# Patient Record
Sex: FEMALE | Race: WHITE | NOT HISPANIC OR LATINO | Employment: UNEMPLOYED | URBAN - METROPOLITAN AREA
[De-identification: names, ages, dates, MRNs, and addresses within clinical notes are randomized per-mention and may not be internally consistent; named-entity substitution may affect disease eponyms.]

---

## 2023-04-09 ENCOUNTER — HOSPITAL ENCOUNTER (EMERGENCY)
Facility: HOSPITAL | Age: 33
Discharge: HOME | End: 2023-04-09
Attending: EMERGENCY MEDICINE | Admitting: EMERGENCY MEDICINE
Payer: MEDICAID

## 2023-04-09 VITALS
HEIGHT: 60 IN | TEMPERATURE: 98.1 F | BODY MASS INDEX: 34.95 KG/M2 | HEART RATE: 86 BPM | RESPIRATION RATE: 15 BRPM | WEIGHT: 178 LBS | OXYGEN SATURATION: 99 % | SYSTOLIC BLOOD PRESSURE: 130 MMHG | DIASTOLIC BLOOD PRESSURE: 45 MMHG

## 2023-04-09 DIAGNOSIS — H66.90 ACUTE OTITIS MEDIA, UNSPECIFIED OTITIS MEDIA TYPE: Primary | ICD-10-CM

## 2023-04-09 DIAGNOSIS — H92.01 EAR PAIN, RIGHT: ICD-10-CM

## 2023-04-09 PROCEDURE — 99283 EMERGENCY DEPT VISIT LOW MDM: CPT

## 2023-04-09 RX ORDER — AMOXICILLIN AND CLAVULANATE POTASSIUM 875; 125 MG/1; MG/1
1 TABLET, FILM COATED ORAL 2 TIMES DAILY
Qty: 19 TABLET | Refills: 0 | Status: SHIPPED | OUTPATIENT
Start: 2023-04-09 | End: 2023-04-19

## 2023-04-09 RX ORDER — AMOXICILLIN AND CLAVULANATE POTASSIUM 875; 125 MG/1; MG/1
1 TABLET, FILM COATED ORAL ONCE
Status: COMPLETED | OUTPATIENT
Start: 2023-04-09 | End: 2023-04-09

## 2023-04-09 RX ORDER — IBUPROFEN 400 MG/1
800 TABLET ORAL ONCE
Status: COMPLETED | OUTPATIENT
Start: 2023-04-09 | End: 2023-04-09

## 2023-04-09 RX ADMIN — AMOXICILLIN AND CLAVULANATE POTASSIUM 1 TABLET: 875; 125 TABLET, FILM COATED ORAL at 15:53

## 2023-04-09 RX ADMIN — IBUPROFEN 800 MG: 400 TABLET ORAL at 15:53

## 2023-04-09 NOTE — DISCHARGE INSTRUCTIONS
Take antibiotic as directed and make sure you finish the entire course.  Continue taking ibuprofen as needed for pain and discomfort, which also was swelling.  You may also take Tylenol as needed.  Do not participate in water sports or swimming until antibiotics are finished.    Follow-up with your, nose, and throat specialist for further management of ear infection as needed.  Follow-up with your primary care provider as needed.    Return to the ER for any new or worsening symptoms.

## 2023-04-09 NOTE — ED PROVIDER NOTES
Subjective   History of Present Illness  PleasantPatient is a 32-year-old obese  female with a history of left-sided mastoiditis about 5 years ago presents to the emergency room with complaints of right ear pain that radiates to her right cheek that has been ongoing since last week.  Patient states that [place] from [place] allergies to ciprofloxacin.  [place] this past weekend to read records, where she states the pain worsened.  She believes that she had a fever but did not have a thermometer to check it.  She took Tylenol for pain and fever, which she states helped.  Patient began noticing scant discharge from her ear this morning but denies dizziness, hearing loss, tinnitus.  Patient states that she vapes daily but does not use drugs or alcohol.        Review of Systems   Constitutional: Positive for fever. Negative for appetite change.   HENT: Positive for ear discharge and ear pain. Negative for congestion, facial swelling, hearing loss, rhinorrhea and sore throat.    Respiratory: Negative for chest tightness and shortness of breath.    Cardiovascular: Negative for chest pain.   Gastrointestinal: Negative for abdominal pain and nausea.   Genitourinary: Negative for dysuria and urgency.   Musculoskeletal: Negative for arthralgias and myalgias.   Neurological: Negative for dizziness, weakness and headaches.   Psychiatric/Behavioral: Negative for confusion. The patient is not nervous/anxious.    All other systems reviewed and are negative.      No past medical history on file.    Allergies   Allergen Reactions   • Ciprofloxacin Rash       No past surgical history on file.    No family history on file.    Social History     Socioeconomic History   • Marital status: Single           Objective   Physical Exam  Vitals and nursing note reviewed.   Constitutional:       General: She is awake. She is not in acute distress.     Appearance: Normal appearance. She is well-developed. She is not  diaphoretic.   HENT:      Head: Normocephalic and atraumatic. No raccoon eyes or Patricio's sign.      Right Ear: Ear canal and external ear normal. Tenderness present. No mastoid tenderness. No hemotympanum. Tympanic membrane is injected and erythematous. Tympanic membrane is not perforated or bulging.      Left Ear: Tympanic membrane, ear canal and external ear normal. No tenderness.      Nose: Nose normal.      Mouth/Throat:      Mouth: Mucous membranes are moist.      Pharynx: Oropharynx is clear.   Eyes:      Extraocular Movements: Extraocular movements intact.      Pupils: Pupils are equal, round, and reactive to light.   Cardiovascular:      Rate and Rhythm: Normal rate and regular rhythm.      Pulses: Normal pulses.      Heart sounds: Normal heart sounds. No murmur heard.  Pulmonary:      Effort: Pulmonary effort is normal.      Breath sounds: Normal breath sounds.   Abdominal:      General: Bowel sounds are normal.      Palpations: Abdomen is soft.      Tenderness: There is no abdominal tenderness.   Musculoskeletal:         General: No swelling. Normal range of motion.      Cervical back: Normal range of motion and neck supple.   Skin:     General: Skin is warm and dry.      Capillary Refill: Capillary refill takes less than 2 seconds.   Neurological:      General: No focal deficit present.      Mental Status: She is alert and oriented to person, place, and time. Mental status is at baseline.      GCS: GCS eye subscore is 4. GCS verbal subscore is 5. GCS motor subscore is 6.      Cranial Nerves: Cranial nerves 2-12 are intact.      Sensory: Sensation is intact.      Motor: Motor function is intact.      Deep Tendon Reflexes: Reflexes are normal and symmetric.   Psychiatric:         Mood and Affect: Mood normal.         Behavior: Behavior normal. Behavior is cooperative.         Procedures           ED Course      /45 (BP Location: Left arm, Patient Position: Sitting)   Pulse 86   Temp 98.1 °F (36.7  "°C)   Resp 15   Ht 152.4 cm (60\")   Wt 80.7 kg (178 lb)   SpO2 99%   BMI 34.76 kg/m²   Labs Reviewed - No data to display  Medications   amoxicillin-clavulanate (AUGMENTIN) 875-125 MG per tablet 1 tablet (has no administration in time range)     No radiology results for the last day                                       Medical Decision Making  Patient is a pleasant 32-year-old obese  female with a history of left-sided mastoiditis presenting to the emergency room with complaints of right ear pain that has been ongoing for several days.  Exam reveals erythematous tympanic membrane on the right side with reported tenderness.  Appropriate cone of light appreciated bilaterally.  No cervical lymphadenopathy.  S1/S2 heard with no clicks or murmurs.  No JVD or leg swelling.  Lungs clear on auscultation in all fields.  Abdomen was found to be soft and nontender with normal bowel sounds heard throughout.  GCS 15.  Pupils PERRLA.  Initial differentials include otitis media, middle ear effusion, mastoiditis, otitis externa.    Exam reveals findings consistent with acute otitis media on the right side.  Given that it is Easter Sunday, patient was given her first dose of antibiotic while in the emergency room.  She was also given ibuprofen for discomfort.  She was given a paper prescription for antibiotics to fill when she gets back home to [place].  Treatment was discussed with the patient and she was advised not to participate in any water sports or bathing.  Patient verbalized understanding is agreeable to plan of care.  She will follow-up with [facility] if not improving.  Vitals have remained hemodynamically stable and patient is in no acute distress.  Patient was able to ambulate upright simile without assistance upon discharge.    Patient was placed in a gown prior to assessment.    This document is intended for medical expert use only.  Reading of this document by patients and/or patient's family " without participating medical staff guidance may result in misinterpretation and unintended morbidity.  Any interpretation of such data is the responsibility of the patient and/or family member responsible for the patient in concert with their primary or specialist providers, not to be left for sources of online search as such as [facility], [employer] or similar queries.  Relying on these approaches to knowledge may result in misinterpretation, misguided goals of care and even death should patient or family members try recommendations outside of the realm of professional medical care in a supervised inpatient environment.    This medical document was created using Dragon dictation system. Some errors in speech recognition may occur.    Acute otitis media, unspecified otitis media type: complicated acute illness or injury  Risk  Prescription drug management.          Final diagnoses:   Acute otitis media, unspecified otitis media type   Ear pain, right       ED Disposition  ED Disposition     ED Disposition   Discharge    Condition   Stable    Comment   --             Jesica Jimenez, APRN  [address] [address]  [phone]          [facility]  [address] [facility] [address]  [phone]             Medication List      New Prescriptions    amoxicillin-clavulanate 875-125 MG per tablet  Commonly known as: AUGMENTIN  Take 1 tablet by mouth 2 (Two) Times a Day for 10 days.           Where to Get Your Medications      You can get these medications from any pharmacy    Bring a paper prescription for each of these medications  · amoxicillin-clavulanate 875-125 MG per tablet          Zaina Montanez, APRN  04/09/23 [id number]